# Patient Record
Sex: FEMALE | Race: WHITE | NOT HISPANIC OR LATINO | Employment: FULL TIME | URBAN - METROPOLITAN AREA
[De-identification: names, ages, dates, MRNs, and addresses within clinical notes are randomized per-mention and may not be internally consistent; named-entity substitution may affect disease eponyms.]

---

## 2017-05-07 ENCOUNTER — HOSPITAL ENCOUNTER (EMERGENCY)
Facility: HOSPITAL | Age: 42
Discharge: HOME | End: 2017-05-07
Attending: EMERGENCY MEDICINE
Payer: COMMERCIAL

## 2017-05-07 VITALS
WEIGHT: 180 LBS | OXYGEN SATURATION: 97 % | HEART RATE: 68 BPM | DIASTOLIC BLOOD PRESSURE: 98 MMHG | RESPIRATION RATE: 18 BRPM | BODY MASS INDEX: 30.73 KG/M2 | TEMPERATURE: 98 F | HEIGHT: 64 IN | SYSTOLIC BLOOD PRESSURE: 147 MMHG

## 2017-05-07 DIAGNOSIS — K08.89 PAIN, DENTAL: Primary | ICD-10-CM

## 2017-05-07 DIAGNOSIS — K04.01 ACUTE PULPITIS: ICD-10-CM

## 2017-05-07 PROCEDURE — 99283 EMERGENCY DEPT VISIT LOW MDM: CPT

## 2017-05-07 PROCEDURE — 25000003 PHARM REV CODE 250: Performed by: PHYSICIAN ASSISTANT

## 2017-05-07 RX ORDER — IBUPROFEN 600 MG/1
600 TABLET ORAL EVERY 6 HOURS PRN
Qty: 20 TABLET | Refills: 0 | Status: SHIPPED | OUTPATIENT
Start: 2017-05-07 | End: 2023-08-15

## 2017-05-07 RX ORDER — TRAMADOL HYDROCHLORIDE 50 MG/1
50 TABLET ORAL EVERY 4 HOURS PRN
Qty: 12 TABLET | Refills: 0 | Status: SHIPPED | OUTPATIENT
Start: 2017-05-07 | End: 2017-05-17

## 2017-05-07 RX ORDER — CLINDAMYCIN HYDROCHLORIDE 300 MG/1
300 CAPSULE ORAL EVERY 6 HOURS
Qty: 28 CAPSULE | Refills: 0 | Status: SHIPPED | OUTPATIENT
Start: 2017-05-07 | End: 2017-05-14

## 2017-05-07 RX ORDER — KETOROLAC TROMETHAMINE 10 MG/1
10 TABLET, FILM COATED ORAL
Status: COMPLETED | OUTPATIENT
Start: 2017-05-07 | End: 2017-05-07

## 2017-05-07 RX ORDER — CITALOPRAM 20 MG/1
20 TABLET, FILM COATED ORAL DAILY
COMMUNITY
End: 2023-08-15

## 2017-05-07 RX ADMIN — KETOROLAC TROMETHAMINE 10 MG: 10 TABLET, FILM COATED ORAL at 08:05

## 2017-05-07 NOTE — ED AVS SNAPSHOT
[facility]  [address]  [place] [address]               Erin Vizcaino   2017  7:34 PM   ED    Description:  Female : 1975   Department:  [facility]           Your Care was Coordinated By:     Provider Role From To    Kailyn Angulo MD Attending Provider 17 --    THOM Mane Physician Assistant 17 --      Reason for Visit     Dental Pain           Diagnoses this Visit        Comments    Pain, dental    -  Primary     Acute pulpitis           ED Disposition     ED Disposition Condition Comment    Discharge             To Do List           Follow-up Information     Follow up with dentist. Schedule an appointment as soon as possible for a visit.       These Medications        Disp Refills Start End    tramadol (ULTRAM) 50 mg tablet 12 tablet 0 2017    Take 1 tablet (50 mg total) by mouth every 4 (four) hours as needed for Pain. - Oral    ibuprofen (ADVIL,MOTRIN) 600 MG tablet 20 tablet 0 2017     Take 1 tablet (600 mg total) by mouth every 6 (six) hours as needed for Pain. - Oral    clindamycin (CLEOCIN) 300 MG capsule 28 capsule 0 2017    Take 1 capsule (300 mg total) by mouth every 6 (six) hours. - Oral      [facility]     [facility] Care [facility] -  Assistance  Unless otherwise directed by your provider, please contact [facility], our nurse care line that is available for  assistance.     Registered nurses in the [facility] provide: appointment scheduling, clinical advisement, health education, and other advisory services.  Call: [phone] (toll free)               Medications           Message regarding Medications     Verify the changes and/or additions to your medication regime listed below are the same as discussed with your clinician today.  If any of these changes or additions are incorrect, please notify your  "healthcare provider.        START taking these NEW medications        Refills    tramadol (ULTRAM) 50 mg tablet 0    Sig: Take 1 tablet (50 mg total) by mouth every 4 (four) hours as needed for Pain.    Class: Print    Route: Oral    ibuprofen (ADVIL,MOTRIN) 600 MG tablet 0    Sig: Take 1 tablet (600 mg total) by mouth every 6 (six) hours as needed for Pain.    Class: Print    Route: Oral    clindamycin (CLEOCIN) 300 MG capsule 0    Sig: Take 1 capsule (300 mg total) by mouth every 6 (six) hours.    Class: Print    Route: Oral      These medications were administered today        Dose Freq    ketorolac tablet 10 mg 10 mg ED 1 Time    Sig: Take 1 tablet (10 mg total) by mouth ED 1 Time.    Class: Normal    Route: Oral    Cosign for Ordering: Required by Kailyn Angulo MD           Verify that the below list of medications is an accurate representation of the medications you are currently taking.  If none reported, the list may be blank. If incorrect, please contact your healthcare provider. Carry this list with you in case of emergency.           Current Medications     citalopram (CELEXA) 20 MG tablet Take 20 mg by mouth once daily.    clindamycin (CLEOCIN) 300 MG capsule Take 1 capsule (300 mg total) by mouth every 6 (six) hours.    ibuprofen (ADVIL,MOTRIN) 600 MG tablet Take 1 tablet (600 mg total) by mouth every 6 (six) hours as needed for Pain.    tramadol (ULTRAM) 50 mg tablet Take 1 tablet (50 mg total) by mouth every 4 (four) hours as needed for Pain.           Clinical Reference Information           Your Vitals Were     BP Pulse Temp Resp Height Weight    147/98 68 98.1 °F (36.7 °C) 18 5' 4" (1.626 m) 81.6 kg (180 lb)    Last Period SpO2 BMI          04/10/2017 97% 30.9 kg/m2        Allergies as of 5/7/2017        Reactions    Pcn [Penicillins]       Immunizations Administered on Date of Encounter - 5/7/2017     None      ED Micro, Lab, POCT     None      ED Imaging Orders     None      Discharge " References/Attachments     DENTAL PAIN (ENGLISH)      [facility] Sign-Up     Activating your [facility] account is as easy as 1-2-3!     1) Visit [email or web address], select Sign Up Now, enter this activation code and your date of birth, then select Next.  [email or web address]  Expires: 6/21/2017  8:15 PM      2) Create a username and password to use when you visit [facility] in the future and select a security question in case you lose your password and select Next.    3) Enter your e-mail address and click Sign Up!    Additional Information  If you have questions, please e-mail [email or web address] or call [phone] to talk to our [facility] staff. Remember, [facility] is NOT to be used for urgent needs. For medical emergencies, dial 911.          [facility] complies with applicable Federal civil rights laws and does not discriminate on the basis of race, color, national origin, age, disability, or sex.        Language Assistance Services     ATTENTION: Language assistance services are available, free of charge. Please call [phone].      ATENCIÓN: Si habla español, tiene a downs disposición servicios gratuitos de asistencia lingüística. Llame al [phone].     CHÚ Ý: N?u b?n nói Ti?ng Vi?t, có các d?ch v? h? tr? ngôn ng? mi?n phí dành cho b?n. G?i s? [phone].

## 2017-05-08 NOTE — ED PROVIDER NOTES
Encounter Date: 5/7/2017       History     Chief Complaint   Patient presents with    Dental Pain     c/o shooting pains to cracked left lower tooth since this morning     Review of patient's allergies indicates:   Allergen Reactions    Pcn [penicillins]      HPI Comments: Erin Vizcaino 41 y.o.nontoxic/afebrile female presented to the ED with C/O left lower dental pain for the past one day. She reports known dental caries of the affected tooth and fracture of the tooth some time ago. She states that pain onset gradually and has increased in severity with pain no radiating to the left ear. She describes pain as a throbbing sensation that is exacerbated by mastication and extreme temperatures. She denies any fever, chills, facial swelling, headache, neck pain, sore throat, drainage or bleeding from the area. She saw minimal relief with aleve at 2 pm. She does have a dentist and has not attempted to contact them as she though pain would subside.    The history is provided by the patient.     History reviewed. No pertinent past medical history.  History reviewed. No pertinent surgical history.  History reviewed. No pertinent family history.  Social History   Substance Use Topics    Smoking status: Never Smoker    Smokeless tobacco: None    Alcohol use No     Review of Systems   Constitutional: Positive for appetite change. Negative for chills and fever.   HENT: Positive for dental problem and ear pain. Negative for congestion, ear discharge, facial swelling, sinus pressure, sore throat and trouble swallowing.         Left facial pain   Eyes: Negative for visual disturbance.   Respiratory: Negative for shortness of breath.    Cardiovascular: Negative for chest pain.   Gastrointestinal: Negative for nausea and vomiting.   Genitourinary: Negative for dysuria.   Musculoskeletal: Negative for neck pain and neck stiffness.   Skin: Negative for rash.   Neurological: Negative for dizziness, weakness, light-headedness  and headaches.   Hematological: Does not bruise/bleed easily.       Physical Exam   Initial Vitals   BP Pulse Resp Temp SpO2   05/07/17 1845 05/07/17 1845 05/07/17 1845 05/07/17 1845 05/07/17 1845   141/101 79 18 98.1 °F (36.7 °C) 97 %     Physical Exam    Nursing note and vitals reviewed.  Constitutional: Vital signs are normal. She appears well-developed and well-nourished. She is cooperative.  Non-toxic appearance. She does not appear ill. She appears distressed.   HENT:   Head: Normocephalic and atraumatic.   Mouth/Throat: Oropharynx is clear and moist. No oral lesions. No trismus in the jaw. Dental caries present. No dental abscesses or uvula swelling.        Face with no swelling or trismus. TTP of the circled  Tooth with moderate dental decay noted with no fluctuance, drainage, bleeding, tonsillar, peritonsillar swelling, erythema, sublingual swelling or lymphadenopathy at this time.   Eyes: Conjunctivae and lids are normal.   Neck: Normal range of motion. Neck supple. No rigidity.   Cardiovascular: Normal rate.   Pulmonary/Chest: No respiratory distress.   Abdominal: Soft. Normal appearance and bowel sounds are normal. There is no rigidity.   Musculoskeletal: Normal range of motion.   Neurological: She is alert and oriented to person, place, and time. She has normal strength. GCS eye subscore is 4. GCS verbal subscore is 5. GCS motor subscore is 6.   Skin: Skin is warm, dry and intact. No rash noted. No erythema.   Psychiatric: She has a normal mood and affect. Her speech is normal and behavior is normal. Thought content normal.         ED Course   Procedures  Labs Reviewed - No data to display     Erin Vizcaino 41 y.o.nontoxic/afebrile female presented to the ED with C/O left lower dental pain for the past one day. She reports known dental caries of the affected tooth and fracture of the tooth some time ago. She states that pain onset gradually and has increased in severity with pain no radiating to the  left ear. She describes pain as a throbbing sensation that is exacerbated by mastication and extreme temperatures. She denies any fever, chills, facial swelling, headache, neck pain, sore throat, drainage or bleeding from the area. She saw minimal relief with aleve at 2 pm. She does have a dentist and has not attempted to contact them as she though pain would subside. ROS positive for dental pain.  Physical exam reveals patient in some distress due to pain but otherwise well appearing. Face with no swelling or trismus. TTP of the circled  Tooth with moderate dental decay noted with no fluctuance, drainage, bleeding, tonsillar, peritonsillar swelling, erythema, sublingual swelling or lymphadenopathy at this time. FROM of the TMJ. TM's and ear canal free of abnormality. FROM of neck and fair ROM of extremities. Lungs clear, heart regular rate and rhythm.    DDX: pain due to dental caries; pulpitis, dental abscess    ED management: Toradol in the ED. patient remains well appearing, afebrile and with no signs of abscess at this time however, as she intends to see dentist we will place on antibiotics. Instructed to take motrin and pain medication as needed.    Impression/Plan: The primary encounter diagnosis was Pain, dental. A diagnosis of Acute pulpitis was also pertinent to this visit. Discharged with cleocin, ultram and motrin. Patient will follow up with Dentist.  Patient cautioned on when to return to ED.  Pt. Understands and agrees with current treatment plan                    Attending Attestation:     Physician Attestation Statement for NP/PA:   I discussed this assessment and plan of this patient with the NP/PA, but I did not personally examine the patient. The face to face encounter was performed by the NP/PA.                  ED Course     Clinical Impression:   The primary encounter diagnosis was Pain, dental. A diagnosis of Acute pulpitis was also pertinent to this visit.                 Karely ROSENTHAL  THOM Smith  05/10/17 1056       Kailyn Angulo MD  05/24/17 8056

## 2017-05-08 NOTE — ED NOTES
Left lower tooth pain that began this am.  Hx of cracked tooth in that area.  Took aleve 2 tablets at 2 pm.  Denies fever

## 2020-06-29 ENCOUNTER — CLINICAL SUPPORT (OUTPATIENT)
Dept: URGENT CARE | Facility: CLINIC | Age: 45
End: 2020-06-29
Payer: COMMERCIAL

## 2020-06-29 DIAGNOSIS — Z03.818 ENCOUNTER FOR OBSERVATION FOR SUSPECTED EXPOSURE TO OTHER BIOLOGICAL AGENTS RULED OUT: ICD-10-CM

## 2020-06-29 PROCEDURE — U0003 INFECTIOUS AGENT DETECTION BY NUCLEIC ACID (DNA OR RNA); SEVERE ACUTE RESPIRATORY SYNDROME CORONAVIRUS 2 (SARS-COV-2) (CORONAVIRUS DISEASE [COVID-19]), AMPLIFIED PROBE TECHNIQUE, MAKING USE OF HIGH THROUGHPUT TECHNOLOGIES AS DESCRIBED BY CMS-2020-01-R: HCPCS

## 2020-06-29 PROCEDURE — 99201 PR OFFICE/OUTPT VISIT,NEW,LEVL I: ICD-10-PCS | Mod: S$GLB,,, | Performed by: NURSE PRACTITIONER

## 2020-06-29 PROCEDURE — 99201 PR OFFICE/OUTPT VISIT,NEW,LEVL I: CPT | Mod: S$GLB,,, | Performed by: NURSE PRACTITIONER

## 2020-06-29 NOTE — PROGRESS NOTES
Subjective:       Patient ID: Erin Vizcaino is a 44 y.o. female.    Vitals:  vitals were not taken for this visit.     Chief Complaint: No chief complaint on file.    Note for asymptomatic patient presenting for Covid Swab.    ROS    Objective:      Physical Exam      Assessment:       1. Encounter for observation for suspected exposure to other biological agents ruled out        Plan:         Encounter for observation for suspected exposure to other biological agents ruled out  -     COVID-19 Routine Screening

## 2020-07-03 ENCOUNTER — TELEPHONE (OUTPATIENT)
Dept: URGENT CARE | Facility: CLINIC | Age: 45
End: 2020-07-03

## 2020-07-03 LAB — SARS-COV-2 RNA RESP QL NAA+PROBE: NOT DETECTED

## 2020-12-09 ENCOUNTER — OFFICE VISIT (OUTPATIENT)
Dept: OPHTHALMOLOGY | Facility: CLINIC | Age: 45
End: 2020-12-09
Payer: COMMERCIAL

## 2020-12-09 DIAGNOSIS — H04.123 DRY EYES, BILATERAL: Primary | ICD-10-CM

## 2020-12-09 DIAGNOSIS — H52.7 REFRACTIVE ERRORS: ICD-10-CM

## 2020-12-09 PROCEDURE — 99999 PR PBB SHADOW E&M-EST. PATIENT-LVL I: CPT | Mod: PBBFAC,,, | Performed by: OPTOMETRIST

## 2020-12-09 PROCEDURE — 92015 DETERMINE REFRACTIVE STATE: CPT | Mod: ,,, | Performed by: OPTOMETRIST

## 2020-12-09 PROCEDURE — 99999 PR PBB SHADOW E&M-EST. PATIENT-LVL I: ICD-10-PCS | Mod: PBBFAC,,, | Performed by: OPTOMETRIST

## 2020-12-09 PROCEDURE — 92004 PR EYE EXAM, NEW PATIENT,COMPREHESV: ICD-10-PCS | Mod: S$PBB,,, | Performed by: OPTOMETRIST

## 2020-12-09 PROCEDURE — 92004 COMPRE OPH EXAM NEW PT 1/>: CPT | Mod: S$PBB,,, | Performed by: OPTOMETRIST

## 2020-12-09 PROCEDURE — 92015 PR REFRACTION: ICD-10-PCS | Mod: ,,, | Performed by: OPTOMETRIST

## 2020-12-09 NOTE — PROGRESS NOTES
HPI     Here for eye exam.  She is having problems with near vision, trouble   reading and seeing computer screen.  She reports that she has dry eyes and   allergy problems sometimes.    Last edited by Mary Strauss on 12/9/2020  1:41 PM. (History)            Assessment /Plan     For exam results, see Encounter Report.    Dry eyes, bilateral    Refractive errors      Worksheet given. Discussed Dry Eyes in detail including Artificial Tears, lubricants, and Omega 3 Fish Oils.    Dispense Final Rx for glasses.  RTC 1 year  Discussed above and answered questions.

## 2021-04-29 ENCOUNTER — PATIENT MESSAGE (OUTPATIENT)
Dept: RESEARCH | Facility: HOSPITAL | Age: 46
End: 2021-04-29

## 2021-11-10 ENCOUNTER — OFFICE VISIT (OUTPATIENT)
Dept: PODIATRY | Facility: CLINIC | Age: 46
End: 2021-11-10
Payer: COMMERCIAL

## 2021-11-10 VITALS — WEIGHT: 179.88 LBS | HEIGHT: 64 IN | BODY MASS INDEX: 30.71 KG/M2

## 2021-11-10 DIAGNOSIS — M72.2 PLANTAR FASCIITIS: Primary | ICD-10-CM

## 2021-11-10 DIAGNOSIS — M62.461 GASTROCNEMIUS EQUINUS OF RIGHT LOWER EXTREMITY: ICD-10-CM

## 2021-11-10 PROCEDURE — 3008F PR BODY MASS INDEX (BMI) DOCUMENTED: ICD-10-PCS | Mod: CPTII,S$GLB,, | Performed by: PODIATRIST

## 2021-11-10 PROCEDURE — 3008F BODY MASS INDEX DOCD: CPT | Mod: CPTII,S$GLB,, | Performed by: PODIATRIST

## 2021-11-10 PROCEDURE — 99204 PR OFFICE/OUTPT VISIT, NEW, LEVL IV, 45-59 MIN: ICD-10-PCS | Mod: S$GLB,,, | Performed by: PODIATRIST

## 2021-11-10 PROCEDURE — 99999 PR PBB SHADOW E&M-EST. PATIENT-LVL III: CPT | Mod: PBBFAC,,, | Performed by: PODIATRIST

## 2021-11-10 PROCEDURE — 99999 PR PBB SHADOW E&M-EST. PATIENT-LVL III: ICD-10-PCS | Mod: PBBFAC,,, | Performed by: PODIATRIST

## 2021-11-10 PROCEDURE — 99204 OFFICE O/P NEW MOD 45 MIN: CPT | Mod: S$GLB,,, | Performed by: PODIATRIST

## 2021-11-10 PROCEDURE — 1159F MED LIST DOCD IN RCRD: CPT | Mod: CPTII,S$GLB,, | Performed by: PODIATRIST

## 2021-11-10 PROCEDURE — 1159F PR MEDICATION LIST DOCUMENTED IN MEDICAL RECORD: ICD-10-PCS | Mod: CPTII,S$GLB,, | Performed by: PODIATRIST

## 2021-11-10 RX ORDER — METHYLPREDNISOLONE 4 MG/1
TABLET ORAL
Qty: 1 EACH | Refills: 0 | Status: SHIPPED | OUTPATIENT
Start: 2021-11-10 | End: 2021-11-10 | Stop reason: SDUPTHER

## 2021-11-10 RX ORDER — METHYLPREDNISOLONE 4 MG/1
TABLET ORAL
Qty: 1 EACH | Refills: 0 | Status: SHIPPED | OUTPATIENT
Start: 2021-11-10 | End: 2021-12-01

## 2024-08-27 ENCOUNTER — OFFICE VISIT (OUTPATIENT)
Dept: INTERNAL MEDICINE | Facility: CLINIC | Age: 49
End: 2024-08-27
Payer: COMMERCIAL

## 2024-08-27 ENCOUNTER — HOSPITAL ENCOUNTER (OUTPATIENT)
Dept: CARDIOLOGY | Facility: HOSPITAL | Age: 49
Discharge: HOME | End: 2024-08-27
Attending: FAMILY MEDICINE
Payer: COMMERCIAL

## 2024-08-27 VITALS
OXYGEN SATURATION: 97 % | DIASTOLIC BLOOD PRESSURE: 90 MMHG | RESPIRATION RATE: 18 BRPM | WEIGHT: 222.25 LBS | HEIGHT: 64 IN | SYSTOLIC BLOOD PRESSURE: 140 MMHG | TEMPERATURE: 98 F | HEART RATE: 91 BPM | BODY MASS INDEX: 37.94 KG/M2

## 2024-08-27 DIAGNOSIS — Z00.00 PREVENTATIVE HEALTH CARE: Primary | ICD-10-CM

## 2024-08-27 DIAGNOSIS — Z11.59 ENCOUNTER FOR HEPATITIS C SCREENING TEST FOR LOW RISK PATIENT: ICD-10-CM

## 2024-08-27 DIAGNOSIS — Z23 NEED FOR DIPHTHERIA-TETANUS-PERTUSSIS (TDAP) VACCINE: ICD-10-CM

## 2024-08-27 DIAGNOSIS — Z12.12 SCREENING FOR COLORECTAL CANCER: ICD-10-CM

## 2024-08-27 DIAGNOSIS — I10 ESSENTIAL HYPERTENSION: Chronic | ICD-10-CM

## 2024-08-27 DIAGNOSIS — Z23 NEED FOR SHINGLES VACCINE: ICD-10-CM

## 2024-08-27 DIAGNOSIS — Z11.4 SCREENING FOR HIV WITHOUT PRESENCE OF RISK FACTORS: ICD-10-CM

## 2024-08-27 DIAGNOSIS — Z12.11 SCREENING FOR COLORECTAL CANCER: ICD-10-CM

## 2024-08-27 DIAGNOSIS — E66.01 CLASS 2 SEVERE OBESITY DUE TO EXCESS CALORIES WITH SERIOUS COMORBIDITY AND BODY MASS INDEX (BMI) OF 38.0 TO 38.9 IN ADULT: Chronic | ICD-10-CM

## 2024-08-27 DIAGNOSIS — R42 DIZZINESS: ICD-10-CM

## 2024-08-27 DIAGNOSIS — Z00.00 PREVENTATIVE HEALTH CARE: ICD-10-CM

## 2024-08-27 DIAGNOSIS — Z13.220 ENCOUNTER FOR LIPID SCREENING FOR CARDIOVASCULAR DISEASE: ICD-10-CM

## 2024-08-27 DIAGNOSIS — Z13.1 SCREENING FOR DIABETES MELLITUS: ICD-10-CM

## 2024-08-27 DIAGNOSIS — Z13.6 ENCOUNTER FOR LIPID SCREENING FOR CARDIOVASCULAR DISEASE: ICD-10-CM

## 2024-08-27 LAB
OHS QRS DURATION: 76 MS
OHS QTC CALCULATION: 435 MS

## 2024-08-27 PROCEDURE — 99386 PREV VISIT NEW AGE 40-64: CPT | Mod: S$GLB,,, | Performed by: FAMILY MEDICINE

## 2024-08-27 PROCEDURE — 3008F BODY MASS INDEX DOCD: CPT | Mod: CPTII,S$GLB,, | Performed by: FAMILY MEDICINE

## 2024-08-27 PROCEDURE — 93010 ELECTROCARDIOGRAM REPORT: CPT | Mod: ,,, | Performed by: INTERNAL MEDICINE

## 2024-08-27 PROCEDURE — 93005 ELECTROCARDIOGRAM TRACING: CPT

## 2024-08-27 PROCEDURE — 3044F HG A1C LEVEL LT 7.0%: CPT | Mod: CPTII,S$GLB,, | Performed by: FAMILY MEDICINE

## 2024-08-27 PROCEDURE — 99999 PR PBB SHADOW E&M-EST. PATIENT-LVL V: CPT | Mod: PBBFAC,,, | Performed by: FAMILY MEDICINE

## 2024-08-27 PROCEDURE — 3079F DIAST BP 80-89 MM HG: CPT | Mod: CPTII,S$GLB,, | Performed by: FAMILY MEDICINE

## 2024-08-27 PROCEDURE — 99204 OFFICE O/P NEW MOD 45 MIN: CPT | Mod: 25,S$GLB,, | Performed by: FAMILY MEDICINE

## 2024-08-27 PROCEDURE — 3077F SYST BP >= 140 MM HG: CPT | Mod: CPTII,S$GLB,, | Performed by: FAMILY MEDICINE

## 2024-08-27 RX ORDER — PLANT STANOL ESTER 450 MG
1 TABLET ORAL DAILY
COMMUNITY
Start: 2024-08-27

## 2024-08-27 RX ORDER — HYDROCHLOROTHIAZIDE 25 MG/1
25 TABLET ORAL DAILY
Qty: 30 TABLET | Refills: 0 | Status: SHIPPED | OUTPATIENT
Start: 2024-08-27 | End: 2025-08-27

## 2024-08-27 NOTE — PROGRESS NOTES
ANNUAL WELLNESS VISIT (PREVENTIVE SERVICES)  8/27/24  9:40 AM CDT    CHIEF COMPLAINT: Establish Care    HEALTH MAINTENANCE INTERVENTIONS - UP TO DATE  Health Maintenance Topics with due status: Not Due       Topic Last Completion Date    Cervical Cancer Screening 08/15/2023    Hemoglobin A1c (Diabetic Prevention Screening) 08/27/2024    Lipid Panel 08/27/2024     HEALTH MAINTENANCE INTERVENTIONS - DUE OR DUE SOON  Health Maintenance Due   Topic Date Due    TETANUS VACCINE  Never done    Colorectal Cancer Screening  Never done    Shingles Vaccine (1 of 2) Never done    Mammogram  08/15/2024    Influenza Vaccine (1) 09/01/2024      This is my first time treating Erin.  Erin is requesting that I assume the role of their primary care provider.  Erin is here for annual wellness and preventive services visit.  Health maintenance care gaps reviewed; relevant closure actions recommended.  CDC-recommended immunizations advocated.  USPSTF-recommended screenings advocated.       Erin is also here for evaluation and management of unrelated problems reported separately.      1. Preventative health care  -     Hemoglobin A1C; Future; Expected date: 08/27/2024  -     Comprehensive Metabolic Panel; Future; Expected date: 08/27/2024  -     Lipid Panel; Future; Expected date: 08/27/2024  -     HEPATITIS C ANTIBODY; Future; Expected date: 08/27/2024  -     HIV 1/2 Ag/Ab (4th Gen); Future; Expected date: 08/27/2024  -     SCHEDULED EKG 12-LEAD (to Muse); Future  -     Cologuard Screening (Multitarget Stool DNA); Future; Expected date: 08/27/2024    2. Encounter for hepatitis C screening test for low risk patient  -     HEPATITIS C ANTIBODY; Future; Expected date: 08/27/2024    3. Screening for HIV without presence of risk factors  -     HIV 1/2 Ag/Ab (4th Gen); Future; Expected date: 08/27/2024    4. Screening for diabetes mellitus  -     Hemoglobin A1C; Future; Expected date: 08/27/2024  -     Comprehensive Metabolic  "Panel; Future; Expected date: 08/27/2024    5. Encounter for lipid screening for cardiovascular disease  -     Lipid Panel; Future; Expected date: 08/27/2024    6. Screening for colorectal cancer  -     Cologuard Screening (Multitarget Stool DNA); Future; Expected date: 08/27/2024    7. Need for shingles vaccine  -     varicella-zoster [id number], PF, (SHINGRIX) 50 mcg/0.5 mL injection; Inject 0.5 mL (one dose) into muscle now; schedule second dose  days later (Patient not taking: Reported on 9/3/2024)  Dispense: 1 each; Refill: 1    8. Need for diphtheria-tetanus-pertussis (Tdap) vaccine    Review of Systems: Except as noted herein, ROS is otherwise negative.     Vitals:    08/27/24 0933 08/27/24 1019 08/27/24 1033   BP: (!) 152/90 (!) 142/86 (!) 140/90   BP Location: Left arm Right arm    Patient Position: Sitting Sitting    BP Method: Large (Manual) Large (Manual)    Pulse: 91     Resp: 18     Temp: 97.9 °F (36.6 °C)     SpO2: 97%     Weight: 100.8 kg (222 lb 3.6 oz)     Height: 5' 4" (1.626 m)     Physical Exam  Vitals reviewed.   Constitutional:       General: She is not in acute distress.     Appearance: Normal appearance. She is not ill-appearing, toxic-appearing or diaphoretic.   HENT:      Head: Normocephalic and atraumatic.      Right Ear: Tympanic membrane, ear canal and external ear normal.      Left Ear: Tympanic membrane, ear canal and external ear normal.   Eyes:      General: No scleral icterus.     Conjunctiva/sclera: Conjunctivae normal.   Neck:      Vascular: No carotid bruit.   Cardiovascular:      Rate and Rhythm: Normal rate and regular rhythm.      Heart sounds: Normal heart sounds.   Pulmonary:      Effort: Pulmonary effort is normal.      Breath sounds: Normal breath sounds.   Abdominal:      General: Bowel sounds are normal. There is no distension.      Palpations: Abdomen is soft. There is no mass.      Tenderness: There is no abdominal tenderness.   Musculoskeletal:         General: " No tenderness.      Cervical back: No muscular tenderness.   Skin:     General: Skin is warm and dry.      Coloration: Skin is not jaundiced.   Neurological:      General: No focal deficit present.      Mental Status: She is alert and oriented to person, place, and time.      Cranial Nerves: No cranial nerve deficit.      Sensory: No sensory deficit.      Motor: No weakness.      Coordination: Coordination normal.      Gait: Gait normal.      Deep Tendon Reflexes: Reflexes normal.   Psychiatric:         Mood and Affect: Mood normal.         Behavior: Behavior normal.         Thought Content: Thought content normal.         Judgment: Judgment normal.         ADDITIONAL E&M SERVICES PROVIDED - UNRELATED TO PREVENTIVE SERVICES  8/27/24  9:40 AM CDT    In addition to and unrelated to the preventive services provided this date, the following condition(s) were also evaluated and managed.    History of Present Illness      She reports experiencing dizziness with onset about a month ago, with four episodes in total. The most recent episode lasted approximately an hour, while previous episodes were shorter. Associated symptoms include feeling off-balance, difficulty walking straight, feeling faint, hot flashes, and nausea. She denies experiencing symptoms similar to benign positional vertigo, such as spinning sensation when turning her head. She initially attributed the dizziness to prolonged computer use and possible dehydration. She attempted to manage symptoms by increasing water intake, which seemed to help, but she is unsure if dehydration was the root cause. No hearing problems or ringing in the ears reported.    She denies previous treatment for hypertension and is currently not taking any medications. She reports a family history of high blood pressure.    She is employed as a  at [employer]. She reports inconsistent eating habits, snacking every few hours. She acknowledges that she may not  be drinking enough water, especially during long periods of sitting at the computer. She expresses awareness that her current diet and exercise habits could be improved.    She has no record of colon cancer screening and is at normal risk for colon cancer, with no family history of the disease. She opted for Cologuard screening over colonoscopy. A mammogram is due; her last mammogram was in September or August of last year. She has an upcoming appointment with her OB/GYN on October 1st, where she typically gets her mammogram. She believes she received a tetanus booster in 2018 when she cut her finger, but the exact location and date are uncertain.    She reports experiencing fainting with a previous tetanus vaccine and expresses reluctance to receive another tetanus booster due to this past adverse reaction.       Assessment & Plan     Diagnosed stage 1 hypertension based on consistently elevated blood pressure readings   Considered patient's genetic predisposition to hypertension   Assessed neurological function to rule out serious causes of reported dizziness   Evaluated for benign positional vertigo, but symptoms not consistent   Determined dizziness likely related to inner ear issues or possible dehydration   Opted to start low-dose antihypertensive medication that may also address inner ear problems  I10 ESSENTIAL (PRIMARY) HYPERTENSION:   Diagnosed stage one hypertension based on blood pressure readings of 142/86 and 140/90.   Discussed the importance of controlling blood pressure to reduce risks of heart attack, stroke, and kidney problems.   Considered genetic factors contributing to hypertension.   Prescribed hydrochlorothiazide (low dose) for blood pressure management.   Recommend OTC potassium gluconate 550 mg or 90 mg (Nature Made brand) to supplement with hydrochlorothiazide.   Ordered comprehensive metabolic panel, cholesterol panel, and EKG.   Advised lifestyle modifications including diet and  exercise.   Scheduled follow-up with Ebony Montejo, nurse practitioner, around the end of September to reevaluate blood pressure control.   Recommend annual follow-up with Dr. Moss if blood pressure becomes well-controlled.  R42 DIZZINESS AND GIDDINESS:   Noted four episodes of dizziness over the past month, with symptoms including feeling off balance, difficulty walking straight, and in one instance, feeling faint, hot, and nauseated.   Performed neurological exam including balance and coordination tests, which were normal.   Suspected vertigo based on the patient's description of symptoms.   Considered possible causes including inner ear issues and dehydration.   Ruled out benign positional vertigo based on the patient's description.   Decided not to prescribe specific medication for dizziness at this time.   Noted that the prescribed hydrochlorothiazide may help with inner ear problems.   Recommend increasing water intake to address potential dehydration contributing to dizziness.   Scheduled follow-up with Ebony Montejo, nurse practitioner, around the end of September to reevaluate dizziness.  Z82.49 FAMILY HISTORY OF ISCHEMIC HEART DISEASE AND OTHER DISEASES OF THE CIRCULATORY SYSTEM:   Confirmed family history of hypertension.   Acknowledged the genetic component of hypertension in the patient's case.  E66.01 MORBID (SEVERE) OBESITY DUE TO EXCESS CALORIES:   Calculated patient's body mass index (BMI) at 38, falling in the class two obesity range.   Acknowledged the need to address obesity as part of overall health management.   Recommend considering weight loss to improve overall health.   Provided information on the DASH diet for hypertension control.   Offered further assistance with obesity management if needed.  LIFESTYLE CHANGES:   Educated on DASH diet for hypertension management.   Erin to improve diet and exercise habits to support blood pressure management.   Erin to follow DASH diet  "guidelines for hypertension control.   Discussed importance of colon cancer screening and options available (colonoscopy vs. Cologuard).   Provided information about shingles vaccine and potential side effects.   Ordered A1C for diabetes screening.   Ordered hepatitis C screening.   Ordered HIV screening.       9. Essential hypertension  -     Comprehensive Metabolic Panel; Future; Expected date: 08/27/2024  -     Lipid Panel; Future; Expected date: 08/27/2024  -     SCHEDULED EKG 12-LEAD (to Muse); Future  -     hydroCHLOROthiazide (HYDRODIURIL) 25 MG tablet; Take 1 tablet (25 mg total) by mouth once daily. (Patient not taking: Reported on 9/3/2024)  Dispense: 30 tablet; Refill: 0  -     potassium gluconate 550 mg (90 mg) Tab; Take 1 tablet by mouth once daily. (Patient not taking: Reported on 9/3/2024)    10. Dizziness    11. Class 2 severe obesity due to excess calories with serious comorbidity and body mass index (BMI) of 38.0 to 38.9 in adult    No other significant complaints or concerns were reported.  Vitals:    08/27/24 0933 08/27/24 1019 08/27/24 1033   BP: (!) 152/90 (!) 142/86 (!) 140/90   BP Location: Left arm Right arm    Patient Position: Sitting Sitting    BP Method: Large (Manual) Large (Manual)    Pulse: 91     Resp: 18     Temp: 97.9 °F (36.6 °C)     SpO2: 97%     Weight: 100.8 kg (222 lb 3.6 oz)     Height: 5' 4" (1.626 m)     Physical Exam  Vitals reviewed.   Constitutional:       General: She is not in acute distress.     Appearance: Normal appearance. She is not ill-appearing, toxic-appearing or diaphoretic.   HENT:      Head: Normocephalic and atraumatic.      Right Ear: Tympanic membrane, ear canal and external ear normal.      Left Ear: Tympanic membrane, ear canal and external ear normal.   Eyes:      General: No scleral icterus.     Conjunctiva/sclera: Conjunctivae normal.   Neck:      Vascular: No carotid bruit.   Cardiovascular:      Rate and Rhythm: Normal rate and regular rhythm.      " "Heart sounds: Normal heart sounds.   Pulmonary:      Effort: Pulmonary effort is normal.      Breath sounds: Normal breath sounds.   Abdominal:      General: Bowel sounds are normal. There is no distension.      Palpations: Abdomen is soft. There is no mass.      Tenderness: There is no abdominal tenderness.   Musculoskeletal:         General: No tenderness.      Cervical back: No muscular tenderness.   Skin:     General: Skin is warm and dry.      Coloration: Skin is not jaundiced.   Neurological:      General: No focal deficit present.      Mental Status: She is alert and oriented to person, place, and time.      Cranial Nerves: No cranial nerve deficit.      Sensory: No sensory deficit.      Motor: No weakness.      Coordination: Coordination normal.      Gait: Gait normal.      Deep Tendon Reflexes: Reflexes normal.   Psychiatric:         Mood and Affect: Mood normal.         Behavior: Behavior normal.         Thought Content: Thought content normal.         Judgment: Judgment normal.       This note was generated with the assistance of ambient listening technology. Verbal consent was obtained by the patient and accompanying visitor(s) for the recording of patient appointment to facilitate this note. I attest to having reviewed and edited the generated note for accuracy, though some syntax or spelling errors may persist. Please contact the author of this note for any clarification.    Documentation entered by me for this encounter may have been done in part using speech-recognition technology. Although I have made an effort to ensure accuracy, "sound like" errors may exist and should be interpreted in context.    "

## 2024-09-03 ENCOUNTER — OFFICE VISIT (OUTPATIENT)
Dept: OTOLARYNGOLOGY | Facility: CLINIC | Age: 49
End: 2024-09-03
Payer: COMMERCIAL

## 2024-09-03 VITALS — BODY MASS INDEX: 37.31 KG/M2 | WEIGHT: 217.38 LBS

## 2024-09-03 DIAGNOSIS — R42 DIZZINESS: Primary | ICD-10-CM

## 2024-09-03 PROCEDURE — 99204 OFFICE O/P NEW MOD 45 MIN: CPT | Mod: S$GLB,,, | Performed by: ORTHOPAEDIC SURGERY

## 2024-09-03 PROCEDURE — 3008F BODY MASS INDEX DOCD: CPT | Mod: CPTII,S$GLB,, | Performed by: ORTHOPAEDIC SURGERY

## 2024-09-03 PROCEDURE — 3044F HG A1C LEVEL LT 7.0%: CPT | Mod: CPTII,S$GLB,, | Performed by: ORTHOPAEDIC SURGERY

## 2024-09-03 PROCEDURE — 1159F MED LIST DOCD IN RCRD: CPT | Mod: CPTII,S$GLB,, | Performed by: ORTHOPAEDIC SURGERY

## 2024-09-03 PROCEDURE — 99999 PR PBB SHADOW E&M-EST. PATIENT-LVL II: CPT | Mod: PBBFAC,,, | Performed by: ORTHOPAEDIC SURGERY

## 2024-09-03 NOTE — PROGRESS NOTES
"Subjective:      Patient ID: Erin Vizcaino is a 48 y.o. female.    Chief Complaint: Dizziness    Patient is a very pleasant 48 y.o. female here to see me today for the first time for evaluation of dizziness.   She reports that the symptoms have been present for the last 1 months.  She says that it happened twice over the last month, occurred after working on a laptop.  She says that her symptoms happen on average once per week  She describes the dizziness as a feeling of being "faint" and generally off balance, no spinning.   She would estimate her symptoms lasted for about an hour to 90 minutes.  She has been trying to increase her hydration.  She has seen her PCP, she did have high blood pressure and was started on BP medication which she felt made her symptoms worse.  She denies aural pressure, tinnitus, and hearing loss.  She does not generally have issues with motion sickness, did fine in [place] this summer.           Review of Systems   HENT:  Positive for ear pain, postnasal drip and sinus pressure.    Respiratory: Negative.     Gastrointestinal:  Positive for diarrhea and vomiting.   Endocrine: Positive for heat intolerance.   Genitourinary: Negative.    Musculoskeletal: Negative.    Skin: Negative.    Neurological:  Positive for dizziness and light-headedness.   Hematological:  Bruises/bleeds easily.   Psychiatric/Behavioral:  The patient is nervous/anxious.        Objective:       Physical Exam  Constitutional:       General: She is not in acute distress.     Appearance: She is well-developed.   HENT:      Head: Normocephalic and atraumatic.      Right Ear: Tympanic membrane, ear canal and external ear normal.      Left Ear: Tympanic membrane, ear canal and external ear normal.      Nose: Nose normal. No nasal deformity, septal deviation, mucosal edema or rhinorrhea.      Right Sinus: No maxillary sinus tenderness or frontal sinus tenderness.      Left Sinus: No maxillary sinus " tenderness or frontal sinus tenderness.      Mouth/Throat:      Mouth: Mucous membranes are not pale and not dry.      Dentition: No dental caries.      Pharynx: Uvula midline. No oropharyngeal exudate or posterior oropharyngeal erythema.   Eyes:      General: Lids are normal. No scleral icterus.     Extraocular Movements:      Right eye: Normal extraocular motion and no nystagmus.      Left eye: Normal extraocular motion and no nystagmus.      Conjunctiva/sclera: Conjunctivae normal.      Right eye: Right conjunctiva is not injected. No chemosis.     Left eye: Left conjunctiva is not injected. No chemosis.     Pupils: Pupils are equal, round, and reactive to light.   Neck:      Thyroid: No thyroid mass or thyromegaly.      Trachea: Trachea and phonation normal. No tracheal tenderness or tracheal deviation.   Pulmonary:      Effort: Pulmonary effort is normal. No respiratory distress.      Breath sounds: No stridor.   Abdominal:      General: There is no distension.   Lymphadenopathy:      Head:      Right side of head: No submental, submandibular, preauricular, posterior auricular or occipital adenopathy.      Left side of head: No submental, submandibular, preauricular, posterior auricular or occipital adenopathy.      Cervical: No cervical adenopathy.   Skin:     General: Skin is warm and dry.      Findings: No erythema or rash.   Neurological:      Mental Status: She is alert and oriented to person, place, and time.      Cranial Nerves: No cranial nerve deficit.   Psychiatric:         Behavior: Behavior normal.         Assessment:       1. Dizziness        Plan:     Dizziness      I had a long discussion with the patient regarding their symptoms.  Dizziness, vertigo and disequilibrium are common symptoms reported by adults during visits to their doctors. They are all symptoms that can result from a peripheral vestibular disorder (a dysfunction of the balance organs of the inner ear) or central vestibular  disorder (a dysfunction of one or more parts of the central nervous system that help process balance and spatial information).  There are also non-vestibular causes of dizziness, and dizziness can be linked to a wide array of problems such as blood-flow irregularities from cardiovascular problems and blood pressure fluctuations.  I would recommend a vestibular screen with audiogram for further diagnostic testing, and will contact the patient with further recommendations when that is complete.  My suspicion is that her symptoms are most related to dehyrdation +/- hypertension.  I also encouraged her to check her BP at home and if consistently elevated to let her PCP know as she may need medication (though first medication suggested did not tolerate).